# Patient Record
Sex: FEMALE | Race: WHITE | NOT HISPANIC OR LATINO | URBAN - METROPOLITAN AREA
[De-identification: names, ages, dates, MRNs, and addresses within clinical notes are randomized per-mention and may not be internally consistent; named-entity substitution may affect disease eponyms.]

---

## 2022-02-28 ENCOUNTER — OFFICE VISIT (OUTPATIENT)
Dept: FAMILY MEDICINE CLINIC | Facility: CLINIC | Age: 22
End: 2022-02-28

## 2022-02-28 VITALS
BODY MASS INDEX: 23.05 KG/M2 | DIASTOLIC BLOOD PRESSURE: 68 MMHG | WEIGHT: 135 LBS | OXYGEN SATURATION: 98 % | HEIGHT: 64 IN | SYSTOLIC BLOOD PRESSURE: 102 MMHG | TEMPERATURE: 97.1 F | HEART RATE: 101 BPM

## 2022-02-28 DIAGNOSIS — M79.675 PAIN AROUND TOENAIL, LEFT FOOT: ICD-10-CM

## 2022-02-28 DIAGNOSIS — N92.6 IRREGULAR PERIODS: Primary | ICD-10-CM

## 2022-02-28 DIAGNOSIS — Z86.018 HISTORY OF BENIGN TUMOR OF BONE AND ARTICULAR CARTILAGE: ICD-10-CM

## 2022-02-28 DIAGNOSIS — B35.1 ONYCHOMYCOSIS OF LEFT GREAT TOE: ICD-10-CM

## 2022-02-28 PROCEDURE — 99204 OFFICE O/P NEW MOD 45 MIN: CPT | Performed by: NURSE PRACTITIONER

## 2022-02-28 NOTE — PROGRESS NOTES
Chief Complaint  Establish Care (new pt - nonfasting) and Menstrual Problem (irregular always - IUD for 2y )    Masks/face shield/appropriate PPE were worn for the entirety of the visit by the patient, MA, and provider.     Subjective          Brian Henry presents to [facility]  History of Present Illness  Brian Henry is a 21 y.o. female who presents to the clinic today to establish care.  Patient has a few concerns today.    Medical history:  · Patient does not have a prevalent past medical history.  She does report a history of a bone tumor removal from her left knee when she was in the first grade and removal of bone tumor to left big toe when she was in seventh grade.  She states tumors were noncancerous.    Concerns today:  · Patient has concerns over episodes of her head feeling warm over the last 6 months that occur 3-5 times a week.  Patient states that she goes to check her temperature and she does not have a fever.  Her symptoms last an hour.  She states her body does not feel warm.  She denies changes in her vision, nausea, vomiting, or dizziness.  She may have a slight headache.  She states she is not doing anything in particular when these symptoms occur, only working at her desk.  · Concerns regarding irregular periods: Patient states that her periods have been irregular since she first started menstruation at the age of 12.  She would typically go about a few months without a period.  Patient did have a copper IUD placed about 2 years ago in July 2020.  She reports still having irregular periods, but every 6 weeks to every 3 to 4 weeks.  Her last period was at the end of January.  She did have some bleeding this week, but it was not a typical start her period.  Patient also feels she has heavy periods and severe cramps and occasional feeling of swaying on her periods. Patient did have an ultrasound of the pelvis performed when she had her IUD placed.  · Concern for  "thyroid disorder: Patient states she is wondering if she has thyroid issues because she occasionally will have thyroid swelling.  She states her throat gets visually swollen about once every 3 months.  She denies trouble swallowing.  She also has episodes of constipation.  · Left toenail abnormality: Patient states that she noticed an abnormality to the toenail of her left big toe that started a couple months ago.  She did state that she left toenail polish on longer than she should have on that toenail.  She is concerned as this is the toe where she had the tumor removed.  She states that there is a horizontal crack across the toenail and her toenail now hurts. She denies toe injury.     Review of Systems   Constitutional: Negative.    HENT: Negative.    Eyes: Negative.    Respiratory: Negative.    Cardiovascular: Negative.    Gastrointestinal: Negative.    Endocrine: Negative.    Genitourinary: Positive for menstrual problem.   Musculoskeletal: Positive for arthralgias.   Skin: Negative.    Allergic/Immunologic: Positive for food allergies.   Neurological: Negative.    Hematological: Negative.    Psychiatric/Behavioral: Negative.       Objective   Vital Signs:   /68   Pulse 101   Temp 97.1 °F (36.2 °C)   Ht 161.3 cm (63.5\")   Wt 61.2 kg (135 lb)   SpO2 98%   BMI 23.54 kg/m²     Physical Exam  Vitals reviewed.   Constitutional:       General: She is not in acute distress.     Appearance: Normal appearance. She is well-developed. She is not ill-appearing, toxic-appearing or diaphoretic.   HENT:      Head: Normocephalic and atraumatic.      Right Ear: Tympanic membrane, ear canal and external ear normal.      Left Ear: Tympanic membrane, ear canal and external ear normal.   Eyes:      General: No scleral icterus.        Right eye: No discharge.         Left eye: No discharge.      Extraocular Movements: Extraocular movements intact.   Neck:      Thyroid: No thyroid mass, thyromegaly or thyroid tenderness. "   Cardiovascular:      Rate and Rhythm: Normal rate and regular rhythm.      Heart sounds: Normal heart sounds.   Pulmonary:      Effort: Pulmonary effort is normal. No respiratory distress.      Breath sounds: Normal breath sounds. No stridor. No wheezing or rhonchi.   Abdominal:      General: Bowel sounds are normal. There is no distension.      Palpations: Abdomen is soft.      Tenderness: There is no abdominal tenderness.   Musculoskeletal:         General: Normal range of motion.      Cervical back: Normal range of motion.      Right lower leg: No edema.      Left lower leg: No edema.   Feet:      Left foot:      Toenail Condition: Left toenails are abnormally thick.      Comments: Horizontal line across left hallux toenail with yellow and thickened nail.  There also appears to be slight dark red discoloration under toenail.   Skin:     General: Skin is warm.   Neurological:      General: No focal deficit present.      Mental Status: She is alert and oriented to person, place, and time.      Motor: No weakness.      Gait: Gait normal.   Psychiatric:         Mood and Affect: Mood normal.         Behavior: Behavior normal.        Result Review :                 Assessment and Plan    Diagnoses and all orders for this visit:    1. Irregular periods (Primary)  -     CBC w AUTO Differential  -     Comprehensive metabolic panel  -     T4, free  -     TSH  -     Prolactin  -     FSH & LH  -     POC Pregnancy, Urine    2. Pain around toenail, left foot  -     Ambulatory Referral to Podiatry    3. 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 Onychomycosis of left great toe  -     Efinaconazole 10 % solution; Apply 1 application topically Daily. Apply to affected toenail(s) once daily for 48 weeks  Dispense: 4 mL; Refill: 0      1. Irregular periods: I will perform baseline testing today and pregnancy test.  Possible etiology could be an endocrine disorder.  We also  discussed that IUDs can at times cause breakthrough bleeding.  I will also try to obtain records from patient's past gynecologist to review ultrasound results to determine if there was presence of fibroids or polyps.  Patient denies any pelvic pain or cramping today.  I would advise patient follow-up with a gynecologist.  2. Occasional thyroid swelling: Patient does not have presence of thyroid swelling today. However, based on her history of symptoms, I will perform TSH and free T4 to further evaluate.  3. Patient does have abnormal thickening of toenail to left hallux.  It does appear to be fungal in nature however based on patient's past history, she has been referred to podiatrist today for further evaluation.  I am going to send in a topical antifungal treatment in the patient's pharmacy.  We will check her liver function today in the event oral antifungal treatment is needed. Patient was advised that this medication would be continued for 12 weeks and can cause adverse liver enzymes.  Patient verbalized understanding.     Follow Up   Return in about 4 weeks (around 3/28/2022).  Patient will be moving back to [place] soon.  She was advised to call our office if she develops any issues prior to her move.  Patient was given instructions and counseling regarding her condition or for health maintenance advice. Please see specific information pulled into the AVS if appropriate.     Electronically signed by RADHA Munoz, 02/28/22, 10:19 AM EST.

## 2022-03-01 LAB
ALBUMIN SERPL-MCNC: 4.4 G/DL (ref 3.9–5)
ALBUMIN/GLOB SERPL: 1.8 {RATIO} (ref 1.2–2.2)
ALP SERPL-CCNC: 63 IU/L (ref 44–121)
ALT SERPL-CCNC: 10 IU/L (ref 0–32)
AST SERPL-CCNC: 15 IU/L (ref 0–40)
BASOPHILS # BLD AUTO: 0 X10E3/UL (ref 0–0.2)
BASOPHILS NFR BLD AUTO: 1 %
BILIRUB SERPL-MCNC: 0.4 MG/DL (ref 0–1.2)
BUN SERPL-MCNC: 10 MG/DL (ref 6–20)
BUN/CREAT SERPL: 18 (ref 9–23)
CALCIUM SERPL-MCNC: 9.2 MG/DL (ref 8.7–10.2)
CHLORIDE SERPL-SCNC: 104 MMOL/L (ref 96–106)
CO2 SERPL-SCNC: 24 MMOL/L (ref 20–29)
CREAT SERPL-MCNC: 0.57 MG/DL (ref 0.57–1)
EGFR GENE MUT ANL BLD/T: 132 ML/MIN/1.73
EOSINOPHIL # BLD AUTO: 0.1 X10E3/UL (ref 0–0.4)
EOSINOPHIL NFR BLD AUTO: 2 %
ERYTHROCYTE [DISTWIDTH] IN BLOOD BY AUTOMATED COUNT: 11.8 % (ref 11.7–15.4)
FSH SERPL-ACNC: 5 MIU/ML
GLOBULIN SER CALC-MCNC: 2.4 G/DL (ref 1.5–4.5)
GLUCOSE SERPL-MCNC: 90 MG/DL (ref 65–99)
HCT VFR BLD AUTO: 40.2 % (ref 34–46.6)
HGB BLD-MCNC: 13.4 G/DL (ref 11.1–15.9)
IMM GRANULOCYTES # BLD AUTO: 0 X10E3/UL (ref 0–0.1)
IMM GRANULOCYTES NFR BLD AUTO: 0 %
LH SERPL-ACNC: 8.2 MIU/ML
LYMPHOCYTES # BLD AUTO: 1.3 X10E3/UL (ref 0.7–3.1)
LYMPHOCYTES NFR BLD AUTO: 25 %
MCH RBC QN AUTO: 29.5 PG (ref 26.6–33)
MCHC RBC AUTO-ENTMCNC: 33.3 G/DL (ref 31.5–35.7)
MCV RBC AUTO: 89 FL (ref 79–97)
MONOCYTES # BLD AUTO: 0.5 X10E3/UL (ref 0.1–0.9)
MONOCYTES NFR BLD AUTO: 10 %
NEUTROPHILS # BLD AUTO: 3.3 X10E3/UL (ref 1.4–7)
NEUTROPHILS NFR BLD AUTO: 62 %
PLATELET # BLD AUTO: 219 X10E3/UL (ref 150–450)
POTASSIUM SERPL-SCNC: 3.9 MMOL/L (ref 3.5–5.2)
PROLACTIN SERPL-MCNC: 30.5 NG/ML (ref 4.8–23.3)
PROT SERPL-MCNC: 6.8 G/DL (ref 6–8.5)
RBC # BLD AUTO: 4.54 X10E6/UL (ref 3.77–5.28)
SODIUM SERPL-SCNC: 140 MMOL/L (ref 134–144)
T4 FREE SERPL-MCNC: 1.56 NG/DL (ref 0.82–1.77)
TSH SERPL DL<=0.005 MIU/L-ACNC: 1.69 UIU/ML (ref 0.45–4.5)
WBC # BLD AUTO: 5.3 X10E3/UL (ref 3.4–10.8)

## 2022-04-01 ENCOUNTER — TELEPHONE (OUTPATIENT)
Dept: FAMILY MEDICINE CLINIC | Facility: CLINIC | Age: 22
End: 2022-04-01

## 2022-04-01 DIAGNOSIS — B35.1 ONYCHOMYCOSIS OF LEFT GREAT TOE: ICD-10-CM

## 2022-04-01 NOTE — TELEPHONE ENCOUNTER
Caller: Brian Henry    Relationship: Self    Best call back number: [phone]    Requested Prescriptions:   Requested Prescriptions     Pending Prescriptions Disp Refills   • Efinaconazole 10 % solution 4 mL 0     Sig: Apply 1 application topically Daily. Apply to affected toenail(s) once daily for 48 weeks        Pharmacy where request should be sent: [facility], [address] [phone] [facility] [phone]      Additional details provided by patient: PATIENT NEVER RECEIVED THE MEDICATION AND HAS MOVED TO [place] AND IS REQUESTING TO HAVE IT SENT TO A LOCAL PHARMACY.    PATIENT STATED THAT IS IN PROCESS OF FINDING NEW PCP IN THE NEW AREA.    Does the patient have less than a 3 day supply:  [x] Yes  [] No    Latha Jin Rep   04/01/22 13:40 EDT